# Patient Record
(demographics unavailable — no encounter records)

---

## 2024-10-21 NOTE — DISCUSSION/SUMMARY
[Normal Growth] : growth [Normal Development] : development [None] : No known medical problems [No Elimination Concerns] : elimination [No Feeding Concerns] : feeding [No Skin Concerns] : skin [Normal Sleep Pattern] : sleep [No Medications] : ~He/She~ is not on any medications [Patient] : patient [Full Activity without restrictions including Physical Education & Athletics] : Full Activity without restrictions including Physical Education & Athletics [FreeTextEntry1] : 8 year female here for well-visit, appropriate growth and development observed. Continue balanced diet with all food groups. Brush teeth twice a day with toothbrush. Recommend visit to dentist. Help child to maintain consistent daily routines and sleep schedule. School discussed. Ensure home is safe. Teach child about personal safety. Use consistent, positive discipline. Limit screen time to less than 2 hours per day. Encourage physical activity. Child needs to ride in a belt-positioning booster seat until  4 feet 9 inches has been reached and are between 8 and 12 years of age.   Return 1 year for routine well child check.

## 2024-10-21 NOTE — HISTORY OF PRESENT ILLNESS
[Mother] : mother [Eats healthy meals and snacks] : eats healthy meals and snacks [Eats meals with family] : eats meals with family [Normal] : Normal [In own bed] : In own bed [Brushing teeth twice/d] : brushing teeth twice per day [Yes] : Patient goes to dentist yearly [Playtime (60 min/d)] : playtime 60 min a day [Participates in after-school activities] : participates in after-school activities [< 2 hrs of screen time per day] : less than 2 hrs of screen time per day [Appropiate parent-child-sibling interaction] : appropriate parent-child-sibling interaction [Does chores when asked] : does chores when asked [Has Friends] : has friends [Adequate social interactions] : adequate social interactions [Adequate behavior] : adequate behavior [Adequate performance] : adequate performance [No difficulties with Homework] : no difficulties with homework [No] : No cigarette smoke exposure [Appropriately restrained in motor vehicle] : appropriately restrained in motor vehicle [Supervised outdoor play] : supervised outdoor play [Supervised around water] : supervised around water [Wears helmet and pads] : wears helmet and pads [Parent knows child's friends] : parent knows child's friends [Monitored computer use] : monitored computer use [Vitamin] : Primary Fluoride Source: Vitamin [Grade ___] : Grade [unfilled] [NO] : No [Exposure to electronic nicotine delivery system] : No exposure to electronic nicotine delivery system [Influenza] : Influenza [FreeTextEntry9] : jessica walker do/ swimming/ art and crafts/ bike- scooter /  basketball/  [de-identified] : social studies  [FreeTextEntry1] : eczema-  well ocntrolled  uses otc emollient creams  /  did notice with indoor swimming- skin gets itchy- recommend to rinse off after swimming - child states chlorine hurts eyes

## 2025-01-27 NOTE — DISCUSSION/SUMMARY
[FreeTextEntry1] : COLLINS on sore throat, headache, runny nose- FLU exposure Tamiflu prophylaxis given Supportive care- fluids, honey, tea, juice- Pedialyte/ Tylenol prn fever  Answered mom sheaiosn return as needed

## 2025-01-27 NOTE — HISTORY OF PRESENT ILLNESS
[de-identified] : sore throat  [FreeTextEntry6] : 7 y/o presents with sore throat, headache, congestion x 2 days. SORE throat worsen today - constant discomfort with swallowing and talking NO fever/ Headache also started today   Some nasal congestion since yesterday NO diarrhe, ear pain, rash or cough BOTH parents diagnosis with INFLUENZA last week .  Mother would like Tamiflu

## 2025-01-27 NOTE — PHYSICAL EXAM
[Clear Rhinorrhea] : clear rhinorrhea [Erythematous Oropharynx] : erythematous oropharynx [NL] : warm, clear [Tired appearing] : not tired appearing [Conjuctival Injection] : no conjunctival injection [Eyelid Swelling] : no eyelid swelling [Enlarged Tonsils] : tonsils not enlarged [Exudate] : no exudate [Palate petechiae] : palate without petechiae